# Patient Record
Sex: FEMALE | Race: WHITE | Employment: OTHER | URBAN - METROPOLITAN AREA
[De-identification: names, ages, dates, MRNs, and addresses within clinical notes are randomized per-mention and may not be internally consistent; named-entity substitution may affect disease eponyms.]

---

## 2019-04-29 ENCOUNTER — ANESTHESIA EVENT (OUTPATIENT)
Dept: ENDOSCOPY | Age: 74
End: 2019-04-29
Payer: MEDICARE

## 2019-04-29 ENCOUNTER — HOSPITAL ENCOUNTER (OUTPATIENT)
Age: 74
Setting detail: OUTPATIENT SURGERY
Discharge: HOME | End: 2019-04-29
Attending: INTERNAL MEDICINE | Admitting: INTERNAL MEDICINE
Payer: MEDICARE

## 2019-04-29 ENCOUNTER — ANESTHESIA (OUTPATIENT)
Dept: ENDOSCOPY | Age: 74
End: 2019-04-29
Payer: MEDICARE

## 2019-04-29 VITALS
SYSTOLIC BLOOD PRESSURE: 179 MMHG | OXYGEN SATURATION: 98 % | BODY MASS INDEX: 32.78 KG/M2 | HEART RATE: 71 BPM | RESPIRATION RATE: 18 BRPM | WEIGHT: 185 LBS | TEMPERATURE: 97.5 F | HEIGHT: 63 IN | DIASTOLIC BLOOD PRESSURE: 69 MMHG

## 2019-04-29 VITALS
TEMPERATURE: 96.8 F | OXYGEN SATURATION: 99 % | SYSTOLIC BLOOD PRESSURE: 120 MMHG | DIASTOLIC BLOOD PRESSURE: 53 MMHG | RESPIRATION RATE: 19 BRPM

## 2019-04-29 LAB
GLUCOSE BLD-MCNC: 141 MG/DL (ref 70–99)
PERFORMED ON: ABNORMAL

## 2019-04-29 PROCEDURE — 3700000001 HC ADD 15 MINUTES (ANESTHESIA): Performed by: INTERNAL MEDICINE

## 2019-04-29 PROCEDURE — 2709999900 HC NON-CHARGEABLE SUPPLY: Performed by: INTERNAL MEDICINE

## 2019-04-29 PROCEDURE — 7100000010 HC PHASE II RECOVERY - FIRST 15 MIN: Performed by: INTERNAL MEDICINE

## 2019-04-29 PROCEDURE — 3609014100 HC ENTEROSCOPY > 2ND PRTN W/CONTROL BLEEDING: Performed by: INTERNAL MEDICINE

## 2019-04-29 PROCEDURE — 3700000000 HC ANESTHESIA ATTENDED CARE: Performed by: INTERNAL MEDICINE

## 2019-04-29 PROCEDURE — 2720000010 HC SURG SUPPLY STERILE: Performed by: INTERNAL MEDICINE

## 2019-04-29 PROCEDURE — 3609155800 HC ENTEROSCOPY WITH INTERVENTION: Performed by: INTERNAL MEDICINE

## 2019-04-29 PROCEDURE — 2580000003 HC RX 258: Performed by: NURSE ANESTHETIST, CERTIFIED REGISTERED

## 2019-04-29 PROCEDURE — 2500000003 HC RX 250 WO HCPCS: Performed by: NURSE ANESTHETIST, CERTIFIED REGISTERED

## 2019-04-29 PROCEDURE — 7100000011 HC PHASE II RECOVERY - ADDTL 15 MIN: Performed by: INTERNAL MEDICINE

## 2019-04-29 PROCEDURE — 6360000002 HC RX W HCPCS: Performed by: NURSE ANESTHETIST, CERTIFIED REGISTERED

## 2019-04-29 RX ORDER — HYDROCHLOROTHIAZIDE 25 MG/1
25 TABLET ORAL DAILY
COMMUNITY

## 2019-04-29 RX ORDER — PROPOFOL 10 MG/ML
INJECTION, EMULSION INTRAVENOUS PRN
Status: DISCONTINUED | OUTPATIENT
Start: 2019-04-29 | End: 2019-04-29 | Stop reason: SDUPTHER

## 2019-04-29 RX ORDER — FUROSEMIDE 20 MG/1
20 TABLET ORAL DAILY
COMMUNITY

## 2019-04-29 RX ORDER — ASPIRIN 81 MG/1
81 TABLET ORAL DAILY
COMMUNITY

## 2019-04-29 RX ORDER — LOSARTAN POTASSIUM 50 MG/1
50 TABLET ORAL DAILY
COMMUNITY

## 2019-04-29 RX ORDER — DORZOLAMIDE HYDROCHLORIDE AND TIMOLOL MALEATE 20; 5 MG/ML; MG/ML
1 SOLUTION/ DROPS OPHTHALMIC DAILY
COMMUNITY

## 2019-04-29 RX ORDER — VERAPAMIL HYDROCHLORIDE 240 MG/1
1 CAPSULE, EXTENDED RELEASE ORAL DAILY
COMMUNITY

## 2019-04-29 RX ORDER — POTASSIUM CHLORIDE 20 MEQ/1
20 TABLET, EXTENDED RELEASE ORAL DAILY
COMMUNITY

## 2019-04-29 RX ORDER — CILOSTAZOL 100 MG/1
1 TABLET ORAL DAILY
COMMUNITY

## 2019-04-29 RX ORDER — ROSUVASTATIN CALCIUM 40 MG/1
40 TABLET, COATED ORAL DAILY
COMMUNITY

## 2019-04-29 RX ORDER — LIDOCAINE HYDROCHLORIDE 20 MG/ML
INJECTION, SOLUTION INTRAVENOUS PRN
Status: DISCONTINUED | OUTPATIENT
Start: 2019-04-29 | End: 2019-04-29 | Stop reason: SDUPTHER

## 2019-04-29 RX ORDER — METOPROLOL SUCCINATE 100 MG/1
100 TABLET, EXTENDED RELEASE ORAL DAILY
COMMUNITY

## 2019-04-29 RX ORDER — GLYCOPYRROLATE 0.2 MG/ML
INJECTION INTRAMUSCULAR; INTRAVENOUS PRN
Status: DISCONTINUED | OUTPATIENT
Start: 2019-04-29 | End: 2019-04-29 | Stop reason: SDUPTHER

## 2019-04-29 RX ORDER — SODIUM CHLORIDE, SODIUM LACTATE, POTASSIUM CHLORIDE, CALCIUM CHLORIDE 600; 310; 30; 20 MG/100ML; MG/100ML; MG/100ML; MG/100ML
INJECTION, SOLUTION INTRAVENOUS CONTINUOUS PRN
Status: DISCONTINUED | OUTPATIENT
Start: 2019-04-29 | End: 2019-04-29 | Stop reason: SDUPTHER

## 2019-04-29 RX ORDER — HYDROCHLOROTHIAZIDE 50 MG/1
50 TABLET ORAL DAILY
COMMUNITY

## 2019-04-29 RX ADMIN — PROPOFOL 100 MG: 10 INJECTION, EMULSION INTRAVENOUS at 09:18

## 2019-04-29 RX ADMIN — PROPOFOL 50 MG: 10 INJECTION, EMULSION INTRAVENOUS at 08:53

## 2019-04-29 RX ADMIN — LIDOCAINE HYDROCHLORIDE 50 MG: 20 INJECTION, SOLUTION INTRAVENOUS at 08:53

## 2019-04-29 RX ADMIN — SODIUM CHLORIDE, SODIUM LACTATE, POTASSIUM CHLORIDE, AND CALCIUM CHLORIDE: 600; 310; 30; 20 INJECTION, SOLUTION INTRAVENOUS at 08:21

## 2019-04-29 RX ADMIN — PROPOFOL 50 MG: 10 INJECTION, EMULSION INTRAVENOUS at 09:11

## 2019-04-29 RX ADMIN — PROPOFOL 100 MG: 10 INJECTION, EMULSION INTRAVENOUS at 09:04

## 2019-04-29 RX ADMIN — GLYCOPYRROLATE 0.2 MG: 0.2 INJECTION, SOLUTION INTRAMUSCULAR; INTRAVENOUS at 08:53

## 2019-04-29 RX ADMIN — PROPOFOL 50 MG: 10 INJECTION, EMULSION INTRAVENOUS at 09:01

## 2019-04-29 RX ADMIN — GLUCAGON HYDROCHLORIDE 1 MG: KIT at 09:06

## 2019-04-29 ASSESSMENT — PULMONARY FUNCTION TESTS
PIF_VALUE: 0
PIF_VALUE: 1
PIF_VALUE: 0
PIF_VALUE: 1
PIF_VALUE: 2
PIF_VALUE: 0
PIF_VALUE: 1
PIF_VALUE: 0
PIF_VALUE: 1
PIF_VALUE: 0
PIF_VALUE: 1
PIF_VALUE: 1
PIF_VALUE: 0

## 2019-04-29 ASSESSMENT — PAIN - FUNCTIONAL ASSESSMENT: PAIN_FUNCTIONAL_ASSESSMENT: 0-10

## 2019-04-29 ASSESSMENT — LIFESTYLE VARIABLES: SMOKING_STATUS: 0

## 2019-04-29 NOTE — ANESTHESIA POSTPROCEDURE EVALUATION
Department of Anesthesiology  Postprocedure Note    Patient: Apolinar Riley  MRN: [id number]  YOB: 1945  Date of evaluation: 4/29/2019  Time:  10:51 AM     Procedure Summary     Date:  04/29/19 Room / Location:  [facility] ENDO 02 / [facility]    Anesthesia Start:  0848 Anesthesia Stop:  8622    Procedures:       ENTEROSCOPY PUSH CONTROL HEMORRHAGE (N/A )      ENTEROSCOPY WITH BALLOON ASSIST (N/A ) Diagnosis:  (FE DEFICIENCY ANEMIA D50.9)    Surgeon:  Aide Flores MD Responsible Provider:  La Stauffer MD    Anesthesia Type:  MAC ASA Status:  3          Anesthesia Type: MAC    Waldo Phase I: Waldo Score: 10    Waldo Phase II: Waldo Score: 10    Last vitals: Reviewed and per EMR flowsheets. Anesthesia Post Evaluation    Patient location during evaluation: PACU  Patient participation: complete - patient participated  Level of consciousness: awake and alert  Pain scale: please refer to nursing notes. Airway patency: patent  Nausea & Vomiting: no nausea and no vomiting  Complications: no  Cardiovascular status: hemodynamically stable  Respiratory status: spontaneous ventilation  Hydration status: stable  Comments: No phone calls received from PACU RN regarding patient.

## 2019-04-29 NOTE — ANESTHESIA PRE PROCEDURE
Department of Anesthesiology  Preprocedure Note       Name:  Juliann Perera   Age:  75 y.o.  :  1945                                          MRN:  [id number]         Date:  2019      Surgeon: Hermelindo Davidson):  Holly Faria MD    Procedure: SINGLE BALLOON ENTEROSCOPY WITH BIPOSY AND APC, MAC (N/A )    Medications prior to admission:   Prior to Admission medications    Not on File       Current medications:    No current facility-administered medications for this encounter. Allergies: Allergies not on file    Problem List:  There is no problem list on file for this patient. Past Medical History:  No past medical history on file. Past Surgical History:  No past surgical history on file. Social History:    Social History     Tobacco Use    Smoking status: Not on file   Substance Use Topics    Alcohol use: Not on file                                Counseling given: Not Answered      Vital Signs (Current): There were no vitals filed for this visit. BP Readings from Last 3 Encounters:   No data found for BP       NPO Status:                                                                                 BMI:   Wt Readings from Last 3 Encounters:   No data found for Wt     There is no height or weight on file to calculate BMI.    CBC: No results found for: WBC, RBC, HGB, HCT, MCV, RDW, PLT    CMP: No results found for: NA, K, CL, CO2, BUN, CREATININE, GFRAA, AGRATIO, LABGLOM, GLUCOSE, PROT, CALCIUM, BILITOT, ALKPHOS, AST, ALT    POC Tests: No results for input(s): POCGLU, POCNA, POCK, POCCL, POCBUN, POCHEMO, POCHCT in the last 72 hours.     Coags: No results found for: PROTIME, INR, APTT    HCG (If Applicable): No results found for: PREGTESTUR, PREGSERUM, HCG, HCGQUANT     ABGs: No results found for: PHART, PO2ART, MSW1DLA, CME4EKD, BEART, H1DPALZQ     Type & Screen (If Applicable):  No results found for: LABABO, [address]    Anesthesia Evaluation  Patient summary reviewed and Nursing notes reviewed no history of anesthetic complications:   Airway: Mallampati: III  TM distance: >3 FB   Neck ROM: full  Mouth opening: > = 3 FB Dental: normal exam   (+) upper dentures      Pulmonary:Negative Pulmonary ROS and normal exam  breath sounds clear to auscultation      (-) sleep apnea and not a current smoker                           Cardiovascular:Negative CV ROS            Rhythm: regular  Rate: normal                    Neuro/Psych:   (+) CVA: residual symptoms,              ROS comment: S/P CEA  Some speech diff  GI/Hepatic/Renal: Neg GI/Hepatic/Renal ROS  (+) morbid obesity     (-) hiatal hernia and GERD       Endo/Other:    (+) DiabetesType II DM, no interval change, , .                  ROS comment: BALA Abdominal:           Vascular: negative vascular ROS. Anesthesia Plan      MAC     ASA 3       Induction: intravenous. Anesthetic plan and risks discussed with patient. Plan discussed with CRNA.     Attending anesthesiologist reviewed and agrees with Patricia Garay MD   4/29/2019

## 2019-04-29 NOTE — H&P
History and Physical / Pre-Sedation Assessment      PMHx:   Past Medical History:   Diagnosis Date    A-fib ([facility] [address]     Acute bronchitis     Aortic valve stenosis     Arthritis     Diabetes mellitus ([facility] [address]     Electrocardiogram showing T wave abnormalities     Heart murmur     Hyperlipidemia     Hypertension     Memory loss     Peripheral artery insufficiency (HCC)     Post-menopausal        Medications:   Prior to Admission medications    Medication Sig Start Date End Date Taking?  Authorizing Provider   aspirin (ASPIRIN 81) 81 MG EC tablet Take 81 mg by mouth daily   Yes Historical Provider, MD   bimatoprost (LUMIGAN) 0.01 % SOLN ophthalmic drops Apply 1 drop to eye daily   Yes Historical Provider, MD   dorzolamide-timolol (COSOPT) 22.3-6.8 MG/ML ophthalmic solution Apply 1 drop to eye daily   Yes Historical Provider, MD   furosemide (LASIX) 20 MG tablet Take 20 mg by mouth daily   Yes Historical Provider, MD   hydrochlorothiazide (HYDRODIURIL) 25 MG tablet Take 25 mg by mouth daily   Yes Historical Provider, MD   hydrochlorothiazide (HYDRODIURIL) 50 MG tablet Take 50 mg by mouth daily   Yes Historical Provider, MD   losartan (COZAAR) 50 MG tablet Take 50 mg by mouth daily   Yes Historical Provider, MD   metFORMIN (GLUCOPHAGE) 850 MG tablet Take 850 mg by mouth 2 times daily   Yes Historical Provider, MD   metoprolol succinate (TOPROL XL) 100 MG extended release tablet Take 100 mg by mouth daily   Yes Historical Provider, MD   potassium chloride (KLOR-CON M) 20 MEQ extended release tablet Take 20 mEq by mouth daily   Yes Historical Provider, MD   rosuvastatin (CRESTOR) 40 MG tablet Take 40 mg by mouth daily   Yes Historical Provider, MD   verapamil (VERELAN) 240 MG extended release capsule Take 1 tablet by mouth daily   Yes Historical Provider, MD   cilostazol (PLETAL) 100 MG tablet Take 1 tablet by mouth daily    Historical Provider, MD   rivaroxaban (XARELTO) 20 MG TABS tablet Take 20 mg by mouth daily disease  Level of Sedation Plan:Deep sedation  Post Procedure plan: Return to same level of care    I assessed the patient and find that the patient is in satisfactory condition to proceed with the planned procedure and sedation plan. I have explained the risk, benefits, and alternatives to the procedure; the patient understands and agrees to proceed.        Bridgett Pryor  4/29/2019

## 2019-04-29 NOTE — OP NOTE
Procedure and Consults  Single Balloon Enteroscopy with APC  Indications  Small Bowel AVMs on capsule study  Consent  The anesthesia and procedure along with the risks, benefits and alternatives of each were explained by the physician  and nurse to the patient to their satisfaction and ample opportunities to ask questions was provided. We discussed  the risks of bleeding, perforation, anesthesia reaction as well as the alternatives, and missed lesions. Verbal and  written consent were obtained with the nurses present. Monitoring  The patient was monitored with continuous pulse oximetry, heart rate monitoring and intermittent blood pressure  monitoring throughout the procedure. Procedure Medications  MAC  Details of Procedure  The Olympus Single Balloon enteroscope was inserted atraumatically into the esophagus and advanced under direct  vision to the mid jejunum using the described technique. The procedure was technically difficult given large hiatal hernia and resulting looping of the scope in the stomach. The  scope was slowly withdrawn carefully inspecting the jejunum, duodenum, entire stomach in both forward and  retroflexed views and esophagus. Multiple non-bleeding AVMs were noted in the distal duodenum and jejunum. These were successfully treated using  APC. 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 Recommendations  Monitor for anemia. Follow up with Dr. Mary Brown. Thank you and please let me know if there are any additional questions or concerns.     Oneyda Comp

## (undated) DEVICE — FIAPC® PROBE W/ FILTER 2200 A OD 2.3MM/6.9FR; L 2.2M/7.2FT: Brand: ERBE

## (undated) DEVICE — SINGLE USE SPLINTING TUBE: Brand: SINGLE USE SPLINTING TUBE